# Patient Record
Sex: MALE | Race: WHITE
[De-identification: names, ages, dates, MRNs, and addresses within clinical notes are randomized per-mention and may not be internally consistent; named-entity substitution may affect disease eponyms.]

---

## 2020-02-05 ENCOUNTER — HOSPITAL ENCOUNTER (OUTPATIENT)
Dept: HOSPITAL 92 - BICCT | Age: 45
Discharge: HOME | End: 2020-02-05
Attending: FAMILY MEDICINE
Payer: COMMERCIAL

## 2020-02-05 DIAGNOSIS — Z13.6: Primary | ICD-10-CM

## 2020-02-05 PROCEDURE — 75571 CT HRT W/O DYE W/CA TEST: CPT

## 2020-02-05 NOTE — CT
CT CORONARY ARTERY CALCIUM [id number]

 

HISTORY: 

44-year-old male for encounter for screening for cardiovascular disorders - primary, ICD-10: Z13.6.


 

FINDINGS: 

There is no coronary artery atherosclerotic calcification that registers on the [employer] software. Ther
e is no cardiomegaly. Central lung zones are grossly clear. 

 

IMPRESSION: 

Coronary artery calcium score is 0. 

 

POS: OFF